# Patient Record
Sex: FEMALE | Race: WHITE
[De-identification: names, ages, dates, MRNs, and addresses within clinical notes are randomized per-mention and may not be internally consistent; named-entity substitution may affect disease eponyms.]

---

## 2017-01-04 ENCOUNTER — HOSPITAL ENCOUNTER (EMERGENCY)
Dept: HOSPITAL 74 - JER | Age: 27
Discharge: HOME | End: 2017-01-04
Payer: COMMERCIAL

## 2017-01-04 VITALS — SYSTOLIC BLOOD PRESSURE: 109 MMHG | DIASTOLIC BLOOD PRESSURE: 78 MMHG | TEMPERATURE: 98.5 F | HEART RATE: 97 BPM

## 2017-01-04 VITALS — BODY MASS INDEX: 24.2 KG/M2

## 2017-01-04 DIAGNOSIS — Z3A.01: ICD-10-CM

## 2017-01-04 DIAGNOSIS — R10.32: ICD-10-CM

## 2017-01-04 DIAGNOSIS — O26.891: Primary | ICD-10-CM

## 2017-01-04 LAB
ALBUMIN SERPL-MCNC: 3.7 G/DL (ref 3.4–5)
ALP SERPL-CCNC: 84 U/L (ref 45–117)
ALT SERPL-CCNC: 19 U/L (ref 12–78)
ANION GAP SERPL CALC-SCNC: 6 MMOL/L (ref 8–16)
APPEARANCE UR: CLEAR
AST SERPL-CCNC: 10 U/L (ref 15–37)
BACTERIA #/AREA URNS HPF: (no result) /HPF
BASOPHILS # BLD: 1 % (ref 0–2)
BILIRUB SERPL-MCNC: 0.7 MG/DL (ref 0.2–1)
BILIRUB UR STRIP.AUTO-MCNC: NEGATIVE MG/DL
CALCIUM SERPL-MCNC: 8.9 MG/DL (ref 8.5–10.1)
CO2 SERPL-SCNC: 31 MMOL/L (ref 21–32)
COLOR UR: YELLOW
CREAT SERPL-MCNC: 0.6 MG/DL (ref 0.55–1.02)
DEPRECATED RDW RBC AUTO: 12.4 % (ref 11.6–15.6)
EOSINOPHIL # BLD: 1.7 % (ref 0–4.5)
GLUCOSE SERPL-MCNC: 102 MG/DL (ref 74–106)
HYALINE CASTS URNS QL MICRO: 3 /LPF
KETONES UR QL STRIP: NEGATIVE
LEUKOCYTE ESTERASE UR QL STRIP.AUTO: NEGATIVE
MCH RBC QN AUTO: 31.3 PG (ref 25.7–33.7)
MCHC RBC AUTO-ENTMCNC: 33.2 G/DL (ref 32–36)
MCV RBC: 94.4 FL (ref 80–96)
MUCOUS THREADS URNS QL MICRO: (no result)
NEUTROPHILS # BLD: 62.1 % (ref 42.8–82.8)
NITRITE UR QL STRIP: NEGATIVE
PH UR: 5 [PH] (ref 5–8)
PLATELET # BLD AUTO: 359 K/MM3 (ref 134–434)
PMV BLD: 7.6 FL (ref 7.5–11.1)
PROT SERPL-MCNC: 7.7 G/DL (ref 6.4–8.2)
PROT UR QL STRIP: NEGATIVE
PROT UR QL STRIP: NEGATIVE
RBC # BLD AUTO: 6 /HPF (ref 0–3)
RBC # UR STRIP: (no result) /UL
SP GR UR: 1.03 (ref 1–1.03)
UROBILINOGEN UR STRIP-MCNC: NEGATIVE E.U./DL (ref 0.2–1)
WBC # BLD AUTO: 10.4 K/MM3 (ref 4–10)
WBC # UR AUTO: 1 /HPF (ref 3–5)

## 2017-01-04 NOTE — PDOC
History of Present Illness





- General


History Source: Patient


Exam Limitations: No Limitations





- History of Present Illness


Initial Comments: 





17 21:35


The patient is a 26 year old pregnant female () with no PMH presents to the 

ED today complaining of LLQ pain for 2 days. The patient reports she recently 

took a home pregnancy test that was positive.  She denies vaginal bleeding/ 

discharge. She is scheduled for prenatal appointment next week. However, she 

developed LLQ pain in the last 2 days.  She states googling her symptoms and 

became concerned for ectopic pregnancy. Patient denies nausea, vomiting, 

diarrhea. Denies frequency, urgency, dysuria, hematuria. She denies loss of 

appetite. 





Denies: fever, chills, coughing, SOB and chest pain.


Allergies: NKDA





<Petrona Rivera - Last Filed: 17 21:35>





- General


History Source: Patient





<Naveen Zaldivar - Last Filed: 17 23:32>





- General


Chief Complaint: Pain, Acute


Stated Complaint: PREG PAIN


Time Seen by Provider: 17 21:17





Past History





<RobinsoniaPetrona - Last Filed: 17 21:35>





- Past Medical History


Asthma: Yes





- Psycho/Social/Smoking Cessation Hx


Suicidal Ideation: No


Smoking Status: No


Smoking History: Never smoked


Number of Cigarettes Smoked Daily: 0





<KishorCarola jamilan - Last Filed: 17 23:32>





- Past Medical History


Allergies/Adverse Reactions: 


 Allergies











Allergy/AdvReac Type Severity Reaction Status Date / Time


 


No Known Allergies Allergy   Verified 17 21:01











Home Medications: 


Ambulatory Orders





NK [No Known Home Medication]  17 











**Review of Systems





- Review of Systems


Able to Perform ROS?: Yes


Comments:: 





17 21:35


CONSTITUTIONAL:


Absent: fever, no chills, no fatigue


EYES:


Absent: visual changes


ENT:


Absent: ear pain, no sore throat


CARDIOVASCULAR:


Absent: chest pain, no palpitations


RESPIRATORY:


Absent: cough, no SOB


GI:


+LLQ pain. Absent: no nausea, no vomiting, no constipation, no diarrhea


GENITOURINARY:


Absent: dysuria, no frequency, no hematuria


MUSKULOSKELETAL:


Absent: back pain, no arthralgia, no myalgia


SKIN:


Absent: rash


NEURO:


Absent: headache








<Petrona Rivera - Last Filed: 17 21:35>





*Physical Exam





- Vital Signs


 Last Vital Signs











Temp Pulse Resp BP Pulse Ox


 


 98.5 F   97 H  18   109/78   99 


 


 17 21:02  17 21:02  17 21:02  17 21:02  17 21:02














- Physical Exam


Comments: 





17 21:36


GENERAL: 


Well-appearing, well-nourished. No apparent distress.


HEENT: 


Normocephalic, atraumatic. PERRL, EOM intact.


CARDIOVASCULAR: 


Normal S1, S2. Regular rate and rhythm.


PULMONARY: 


Clear to auscultation bilaterally.


ABDOMEN: 


Soft, non-distended, mild LLQ tenderness.  


EXTREMITIES: 


Normal ROM in all four extremities. No gross deformities.


SKIN: 


Warm, dry.  No rash


NEUROLOGICAL: 


No focal neurological deficits.








<Petrona Rivera - Last Filed: 17 21:35>





- Vital Signs


 Last Vital Signs











Temp Pulse Resp BP Pulse Ox


 


 98.5 F   97 H  18   109/78   99 


 


 17 21:02  17 21:02  17 21:02  17 21:02  17 21:02














<Naveen Zaldivar - Last Filed: 17 23:32>





ED Treatment Course





- LABORATORY


CBC & Chemistry Diagram: 


 17 21:30





 17 21:30





<Naveen Zaldivar - Last Filed: 17 23:32>





Medical Decision Making





- Medical Decision Making





17 23:32


Dr. Zaldivar: The scribe's documentation has been prepared under my direction 

and personally reviewed by me in its entirery. I confirm that the note above 

accurately reflects all work, treatment, procedures, and medical decision 

making performed by me.





<Naveen Zaldivar - Last Filed: 17 23:32>





*DC/Admit/Observation/Transfer





- Attestations


Scribe Attestion: 





17 21:36





Documentation prepared by Petrona Rivera, acting as medical scribe for Naveen Zaldivar MD/DO.





<NicolePetrona - Last Filed: 17 21:35>





- Discharge Dispostion


Admit: No





<Naveen Zaldivar - Last Filed: 17 23:32>


Diagnosis at time of Disposition: 


 4 weeks gestation of pregnancy





- Discharge Dispostion


Disposition: HOME


Condition at time of disposition: Stable





- Referrals


Referrals: 


Zev Gaitan MD [Staff Physician] - 





- Patient Instructions


Printed Discharge Instructions:  Managing Symptoms of Pregnancy

## 2017-02-18 ENCOUNTER — HOSPITAL ENCOUNTER (EMERGENCY)
Dept: HOSPITAL 74 - JER | Age: 27
LOS: 1 days | Discharge: HOME | End: 2017-02-19
Payer: COMMERCIAL

## 2017-02-18 VITALS — HEART RATE: 122 BPM | DIASTOLIC BLOOD PRESSURE: 70 MMHG | SYSTOLIC BLOOD PRESSURE: 118 MMHG

## 2017-02-18 VITALS — BODY MASS INDEX: 25.8 KG/M2

## 2017-02-18 DIAGNOSIS — Z3A.12: ICD-10-CM

## 2017-02-18 DIAGNOSIS — J09.X2: ICD-10-CM

## 2017-02-18 DIAGNOSIS — O98.511: Primary | ICD-10-CM

## 2017-02-19 VITALS — TEMPERATURE: 99 F

## 2017-02-19 NOTE — PDOC
History of Present Illness





- General


Chief Complaint: Cold Symptoms


Stated Complaint: COLD SYMPTOMS/12 WKS PREGNANT


Time Seen by Provider: 17 00:47





- History of Present Illness


Initial Comments: 


CHIEF COMPLAINT: cold symptoms





HISTORY OF PRESENT ILLNESS: 26 yo 12 week pregnant F presents to ED with fever, 

head and body aches, and runny nose x 1 day.  Patient states the symptoms came 

out of nowhere and she suddenly felt like her whole body was fatigued.  She 

denies nausea, vomiting, diarrhea.





No recent travel or sick contacts. 





PAST MEDICAL HISTORY: Denies past medical history





FAMILY HISTORY: Denies





SOCIAL HISTORY: Denies tobacco, alcohol, illicit drug use. 





SURGICAL HISTORY: Denies





ALLERGIES: No known drug allergies





REVIEW OF SYSTEMS


General/Constitutional: Fever, malaise. Denies weakness, weight change.





HEENT: Denies change in vision. Denies ear pain or discharge. Denies sore 

throat.





Cardiovascular: Denies chest pain or shortness of breath.





Respiratory: Cough. Denies wheezing, or hemoptysis.





Gastrointestinal: Denies nausea, vomiting, diarrhea or constipation. 





Musculoskeletal: Body aches.





Skin and breasts: Denies rash or easy bruising.





Neurologic: Headache.  Denies vertigo, loss of consciousness, or loss of 

sensation.





PHYSICAL EXAM


General Appearance: Well-appearing, appropriately dressed.  No apparent 

distress.





HEENT: Swollen turbinates, rhinorrhea. EOMI, PERRLA, normal ENT inspection, 

normal voice, TMs normal, pharynx normal.  No conjunctival pallor.  No 

photophobia, scleral icterus.





Respiratory/Chest: Lungs CTAB.  





Cardiovascular: RRR. S1, S2.  





Gastrointestinal/Abdominal: Normal bowel sounds.  Abdomen soft, non-distended.  

No tenderness or rebound tenderness. No  organomegaly, pulsatile mass, guarding

, hernia, hepatomegaly, splenomegaly.





Musculoskeletal/Extremities:  Normal inspection. FROM of all extremities, 

normal capillary refill.  Pelvis Stable.  No CVA tenderness. No tenderness to 

extremities, pedal edema, swelling, erythema or deformity.





Integumentary: Appropriate color, dry, warm.  No cyanosis, erythema, jaundice 

or rash





Neurologic: CNs II-XII intact. Fully oriented, alert.  Appropriate mood/affect. 

Motor strength 5/5.  No appreciable EOM palsy, facial droop or sensory deficit.











Past History





- Past Medical History


Allergies/Adverse Reactions: 


 Allergies











Allergy/AdvReac Type Severity Reaction Status Date / Time


 


No Known Allergies Allergy   Verified 17 22:45











Home Medications: 


Ambulatory Orders





Acetaminophen [Tylenol] 650 mg PO QID 17 


Oseltamivir Phosphate [Tamiflu -] 75 mg PO BID #10 capsule 17 








Asthma: Yes





- Reproductive History


 (#): 5


Para: 3


Polycystic Ovaries: No


Therapeutic (s) & number: Yes (1)


Spontaneous : 0





- Psycho/Social/Smoking Cessation Hx


Suicidal Ideation: No


Smoking Status: No


Smoking History: Never smoked


Number of Cigarettes Smoked Daily: 0





*Physical Exam





- Vital Signs


 Last Vital Signs











Temp Pulse Resp BP Pulse Ox


 


 99 F   122 H  18   118/70   98 


 


 17 01:21  17 22:47  17 22:47  17 22:47  17 22:47














ED Treatment Course





- ADDITIONAL ORDERS


Additional order review: 














 17 01:57 Influenza Types A,B Antigen (PABLO) - Final





 Nasopharyngeal Swab  - Final


 


 17 01:57 Group A Strep Rapid Antigen - Final





 Throat 














- Medications


Given in the ED: 


ED Medications














Discontinued Medications














Generic Name Dose Route Start Last Admin





  Trade Name Freq  PRN Reason Stop Dose Admin


 


Acetaminophen  650 mg 17 01:56 17 02:05





  Tylenol -  PO 17 01:57  650 mg





  ONCE ONE   Administration


 


Diphenhydramine HCl  25 mg 17 01:39 17 02:05





  Benadryl Injection -  IVPUSH 17 01:40  25 mg





  ONCE ONE   Administration


 


Metoclopramide HCl  10 mg 17 01:39 17 02:05





  Reglan Injection -  IVPUSH 17 01:40  10 mg





  ONCE ONE   Administration


 


Sodium Chloride  1,000 ml 17 01:38 17 02:05





  Normal Saline -  IV 17 01:39  1,000 ml





  ONCE ONE   Administration














Medical Decision Making





- Medical Decision Making


26 yo 12 week pregnant F presents to ED with sudden onset cold symptoms and 

fever. 





-rapid flu and strep swabs





Flu positive





-Tamiflu po, script sent to pharm





Advised patient to take medications as prescribed and follow up with PMD.  

Advised patient of signs and symptoms for return to ER; patient verbalized 

understanding and agrees to plan. 











*DC/Admit/Observation/Transfer


Diagnosis at time of Disposition: 


 Influenza A





- Discharge Dispostion


Disposition: HOME


Condition at time of disposition: Stable


Admit: No





- Prescriptions


Prescriptions: 


Oseltamivir Phosphate [Tamiflu -] 75 mg PO BID #10 capsule





- Referrals


Referrals: 


STAFF,NOT ON [Primary Care Provider] - 





- Patient Instructions


Printed Discharge Instructions:  DI for Influenza -- Adult


Additional Instructions: 


Please take medication as prescribed.  Continue taking Tylenol for fever or 

pain.  If you experience persistent fever unrelieved by Tylenol, or continuous 

vomiting or diarrhea, shortness of breath, chest pain, vaginal bleeding, or any 

new or worsening symptoms, please return to the ER.

## 2018-11-04 ENCOUNTER — INPATIENT (INPATIENT)
Facility: HOSPITAL | Age: 28
LOS: 1 days | Discharge: HOME | End: 2018-11-06
Attending: PLASTIC SURGERY | Admitting: PLASTIC SURGERY
Payer: MEDICAID

## 2018-11-04 VITALS
RESPIRATION RATE: 21 BRPM | HEART RATE: 96 BPM | DIASTOLIC BLOOD PRESSURE: 83 MMHG | OXYGEN SATURATION: 98 % | SYSTOLIC BLOOD PRESSURE: 125 MMHG | TEMPERATURE: 98 F

## 2018-11-04 LAB
APTT BLD: 30.3 SEC — SIGNIFICANT CHANGE UP (ref 27.5–36.3)
BASOPHILS # BLD AUTO: 0.04 K/UL — SIGNIFICANT CHANGE UP (ref 0–0.2)
BASOPHILS NFR BLD AUTO: 0.4 % — SIGNIFICANT CHANGE UP (ref 0–2)
EOSINOPHIL # BLD AUTO: 0.07 K/UL — SIGNIFICANT CHANGE UP (ref 0–0.5)
EOSINOPHIL NFR BLD AUTO: 0.6 % — SIGNIFICANT CHANGE UP (ref 0–6)
HCT VFR BLD CALC: 36.1 % — SIGNIFICANT CHANGE UP (ref 34.5–45)
HGB BLD-MCNC: 12.5 G/DL — SIGNIFICANT CHANGE UP (ref 11.5–15.5)
IMM GRANULOCYTES # BLD AUTO: 0.04 # — SIGNIFICANT CHANGE UP
IMM GRANULOCYTES NFR BLD AUTO: 0.4 % — SIGNIFICANT CHANGE UP (ref 0–1.5)
INR BLD: 1.36 — HIGH (ref 0.88–1.17)
LYMPHOCYTES # BLD AUTO: 2.62 K/UL — SIGNIFICANT CHANGE UP (ref 1–3.3)
LYMPHOCYTES # BLD AUTO: 23.9 % — SIGNIFICANT CHANGE UP (ref 13–44)
MCHC RBC-ENTMCNC: 32.6 PG — SIGNIFICANT CHANGE UP (ref 27–34)
MCHC RBC-ENTMCNC: 34.6 % — SIGNIFICANT CHANGE UP (ref 32–36)
MCV RBC AUTO: 94.3 FL — SIGNIFICANT CHANGE UP (ref 80–100)
MONOCYTES # BLD AUTO: 0.85 K/UL — SIGNIFICANT CHANGE UP (ref 0–0.9)
MONOCYTES NFR BLD AUTO: 7.8 % — SIGNIFICANT CHANGE UP (ref 2–14)
NEUTROPHILS # BLD AUTO: 7.32 K/UL — SIGNIFICANT CHANGE UP (ref 1.8–7.4)
NEUTROPHILS NFR BLD AUTO: 66.9 % — SIGNIFICANT CHANGE UP (ref 43–77)
NRBC # FLD: 0 — SIGNIFICANT CHANGE UP
PLATELET # BLD AUTO: 356 K/UL — SIGNIFICANT CHANGE UP (ref 150–400)
PMV BLD: 8.8 FL — SIGNIFICANT CHANGE UP (ref 7–13)
PROTHROM AB SERPL-ACNC: 15.2 SEC — HIGH (ref 9.8–13.1)
RBC # BLD: 3.83 M/UL — SIGNIFICANT CHANGE UP (ref 3.8–5.2)
RBC # FLD: 11.8 % — SIGNIFICANT CHANGE UP (ref 10.3–14.5)
WBC # BLD: 10.94 K/UL — HIGH (ref 3.8–10.5)
WBC # FLD AUTO: 10.94 K/UL — HIGH (ref 3.8–10.5)

## 2018-11-04 RX ORDER — MORPHINE SULFATE 50 MG/1
4 CAPSULE, EXTENDED RELEASE ORAL ONCE
Qty: 0 | Refills: 0 | Status: DISCONTINUED | OUTPATIENT
Start: 2018-11-04 | End: 2018-11-04

## 2018-11-04 RX ORDER — HYDROMORPHONE HYDROCHLORIDE 2 MG/ML
1 INJECTION INTRAMUSCULAR; INTRAVENOUS; SUBCUTANEOUS ONCE
Qty: 0 | Refills: 0 | Status: DISCONTINUED | OUTPATIENT
Start: 2018-11-04 | End: 2018-11-04

## 2018-11-04 RX ADMIN — MORPHINE SULFATE 4 MILLIGRAM(S): 50 CAPSULE, EXTENDED RELEASE ORAL at 23:36

## 2018-11-04 NOTE — ED PROVIDER NOTE - OBJECTIVE STATEMENT
27 yo PMHx anxiety p/w right breast pain. Pt had silicon implants placed on 10/24 with a surgeon in [place] and followed up with surgeon on 10/2 with 27 yo PMHx anxiety p/w right breast pain. Pt had silicon implants placed on 10/24 with a surgeon in [place] and followed up with surgeon on 11/2 - at that point there were no complications. Today around 2 pm pt started having pain in the right breast. She went to [facility] and had an outpatient US done which showed a fluid collection (possible hematoma?) in the breast. She spoke to her surgeon who advised her to come back to [place] for evaluation. She was at the airport and then started having worsening right breast pain. She came to ER by ambulance for further evaluation. She denies fevers/chills, N/V, abd pain, discharge from the breast. 27 yo PMHx anxiety p/w right breast pain. Pt had silicon implants placed on 10/24 with a surgeon in [place] and followed up with surgeon on 11/2 - at that point there were no complications. Today around 2 pm pt started having pain in the right breast. She went to [facility] and had an outpatient US done which showed a fluid collection (possible hematoma?) in the breast. She spoke to her surgeon who advised her to come back to [place] for evaluation. She was at the airport and then started having worsening right breast pain. She came to ER by ambulance for further evaluation. She denies fevers/chills, N/V, abd pain, discharge from the breast. Pt was on Cephalexin x 7 days s/p surgery.

## 2018-11-04 NOTE — ED ADULT NURSE NOTE - OBJECTIVE STATEMENT
Received pt in spot 17. [id number]. S/p breast implant surgery in [place] on 10/24/18. Pt had follow up appt in [place] on Friday where pt states everything was normal. Today at approx 130pm pt developed right sided breast pain and swelling. Right breast appears swollen, no drainage from nipple. Pt states left breast only has pressure and no pain. Denies fevers/chills. Denies N/V. VS as noted. 20G in place by ems to right wrist, labs sent. Will continue to monitor.

## 2018-11-04 NOTE — ED PROVIDER NOTE - PROGRESS NOTE DETAILS
Seun SIMMS: Pt signed out to me.  She reports some improvement in pain despite morphine and dilaudid.  US without e/o abscess or fluid collection.  Will rx symptomatically for mastitis, d/w patient need for obs for pain control, plastics eval (pt is in contact with her surgeon Dr Patino [phone] cell) who is ?coming to evaluate her/follow-up with her.

## 2018-11-04 NOTE — ED PROVIDER NOTE - ATTENDING CONTRIBUTION TO CARE
Locurto  pt here s/p recent b/l breast implant  c/o pain and swelling to right breast  no fever  no discharge from surgical scar        exam  pt with clear lungs card RRR S1S2  rt breast swollen c/w left  not red  tender upper breat firm to touch  surgical scar clean  no discharge      Plan  check CBC  arrange for focused US  here

## 2018-11-04 NOTE — ED ADULT TRIAGE NOTE - CHIEF COMPLAINT QUOTE
Pt brought in by EMS c/o pain secondary to B/L breast implantation approx 1 week ago (10/24/18).  Pt arrives with 20g IVL to R wrist.  Received 5mg morphine by EMS.  Pt states on ultrasound has R breast hematoma.  R breast noticeably more swollen than L.  Denies any PMHx.

## 2018-11-04 NOTE — ED PROVIDER NOTE - PHYSICAL EXAMINATION
Gen: AAOx3, non-toxic, pt screaming in pain  Head: NCAT  HEENT: EOMI, oral mucosa moist, normal conjunctiva  Lung: CTAB, no respiratory distress, no wheezes/rhonchi/rales B/L, speaking in full sentences  CV: RRR, no murmurs, rubs or gallops  Breast: (chaperoned by Lucila RN - right breast with tenderness to superior aspect, more firm compared to left breast, no erythema or discharge, R breast larger than left and swollen at superior aspect).  Abd: soft, NTND, no guarding  MSK: no visible deformities  Neuro: No focal sensory or motor deficits  Skin: Warm, well perfused, no rash  Psych: normal affect.   ~Vikram Duvall M.D. Resident

## 2018-11-04 NOTE — ED ADULT NURSE NOTE - NSIMPLEMENTINTERV_GEN_ALL_ED
Implemented All Fall Risk Interventions:  [place] to call system. Call bell, personal items and telephone within reach. Instruct patient to call for assistance. Room bathroom lighting operational. Non-slip footwear when patient is off stretcher. Physically safe environment: no spills, clutter or unnecessary equipment. Stretcher in lowest position, wheels locked, appropriate side rails in place. Provide visual cue, wrist band, yellow gown, etc. Monitor gait and stability. Monitor for mental status changes and reorient to person, place, and time. Review medications for side effects contributing to fall risk. Reinforce activity limits and safety measures with patient and family.

## 2018-11-04 NOTE — ED PROVIDER NOTE - NS ED ROS FT
GENERAL: No fever or chills, EYES: no change in vision, HEENT: no trouble swallowing or speaking, CARDIAC: no chest pain, PULMONARY: no cough or SOB, GI: no abdominal pain, no nausea, no vomiting, no diarrhea or constipation, : No changes in urination, SKIN: no rashes, R breast pain, NEURO: no headache,  MSK: No joint pain ~Vikram Duvall M.D. Resident

## 2018-11-05 DIAGNOSIS — Z98.86 PERSONAL HISTORY OF BREAST IMPLANT REMOVAL: Chronic | ICD-10-CM

## 2018-11-05 DIAGNOSIS — N64.4 MASTODYNIA: ICD-10-CM

## 2018-11-05 DIAGNOSIS — Z98.82 BREAST IMPLANT STATUS: Chronic | ICD-10-CM

## 2018-11-05 LAB
ALBUMIN SERPL ELPH-MCNC: 4 G/DL — SIGNIFICANT CHANGE UP (ref 3.3–5)
ALP SERPL-CCNC: 72 U/L — SIGNIFICANT CHANGE UP (ref 40–120)
ALT FLD-CCNC: 23 U/L — SIGNIFICANT CHANGE UP (ref 4–33)
APTT BLD: 30.7 SEC — SIGNIFICANT CHANGE UP (ref 27.5–36.3)
AST SERPL-CCNC: 43 U/L — HIGH (ref 4–32)
BASOPHILS # BLD AUTO: 0.04 K/UL — SIGNIFICANT CHANGE UP (ref 0–0.2)
BASOPHILS NFR BLD AUTO: 0.4 % — SIGNIFICANT CHANGE UP (ref 0–2)
BILIRUB SERPL-MCNC: 0.5 MG/DL — SIGNIFICANT CHANGE UP (ref 0.2–1.2)
BLD GP AB SCN SERPL QL: NEGATIVE — SIGNIFICANT CHANGE UP
BLD GP AB SCN SERPL QL: NEGATIVE — SIGNIFICANT CHANGE UP
BUN SERPL-MCNC: 10 MG/DL — SIGNIFICANT CHANGE UP (ref 7–23)
BUN SERPL-MCNC: 12 MG/DL — SIGNIFICANT CHANGE UP (ref 7–23)
CALCIUM SERPL-MCNC: 8.7 MG/DL — SIGNIFICANT CHANGE UP (ref 8.4–10.5)
CALCIUM SERPL-MCNC: 8.9 MG/DL — SIGNIFICANT CHANGE UP (ref 8.4–10.5)
CHLORIDE SERPL-SCNC: 101 MMOL/L — SIGNIFICANT CHANGE UP (ref 98–107)
CHLORIDE SERPL-SCNC: 99 MMOL/L — SIGNIFICANT CHANGE UP (ref 98–107)
CO2 SERPL-SCNC: 22 MMOL/L — SIGNIFICANT CHANGE UP (ref 22–31)
CO2 SERPL-SCNC: 23 MMOL/L — SIGNIFICANT CHANGE UP (ref 22–31)
CREAT SERPL-MCNC: 0.57 MG/DL — SIGNIFICANT CHANGE UP (ref 0.5–1.3)
CREAT SERPL-MCNC: 0.6 MG/DL — SIGNIFICANT CHANGE UP (ref 0.5–1.3)
EOSINOPHIL # BLD AUTO: 0.04 K/UL — SIGNIFICANT CHANGE UP (ref 0–0.5)
EOSINOPHIL NFR BLD AUTO: 0.4 % — SIGNIFICANT CHANGE UP (ref 0–6)
GLUCOSE SERPL-MCNC: 124 MG/DL — HIGH (ref 70–99)
GLUCOSE SERPL-MCNC: 135 MG/DL — HIGH (ref 70–99)
HCG SERPL-ACNC: < 5 MIU/ML — SIGNIFICANT CHANGE UP
HCT VFR BLD CALC: 34.4 % — LOW (ref 34.5–45)
HGB BLD-MCNC: 12 G/DL — SIGNIFICANT CHANGE UP (ref 11.5–15.5)
IMM GRANULOCYTES # BLD AUTO: 0.04 # — SIGNIFICANT CHANGE UP
IMM GRANULOCYTES NFR BLD AUTO: 0.4 % — SIGNIFICANT CHANGE UP (ref 0–1.5)
INR BLD: 1.46 — HIGH (ref 0.88–1.17)
LYMPHOCYTES # BLD AUTO: 2.68 K/UL — SIGNIFICANT CHANGE UP (ref 1–3.3)
LYMPHOCYTES # BLD AUTO: 24.9 % — SIGNIFICANT CHANGE UP (ref 13–44)
MCHC RBC-ENTMCNC: 33.6 PG — SIGNIFICANT CHANGE UP (ref 27–34)
MCHC RBC-ENTMCNC: 34.9 % — SIGNIFICANT CHANGE UP (ref 32–36)
MCV RBC AUTO: 96.4 FL — SIGNIFICANT CHANGE UP (ref 80–100)
MONOCYTES # BLD AUTO: 1.08 K/UL — HIGH (ref 0–0.9)
MONOCYTES NFR BLD AUTO: 10 % — SIGNIFICANT CHANGE UP (ref 2–14)
NEUTROPHILS # BLD AUTO: 6.88 K/UL — SIGNIFICANT CHANGE UP (ref 1.8–7.4)
NEUTROPHILS NFR BLD AUTO: 63.9 % — SIGNIFICANT CHANGE UP (ref 43–77)
NRBC # FLD: 0 — SIGNIFICANT CHANGE UP
PLATELET # BLD AUTO: 345 K/UL — SIGNIFICANT CHANGE UP (ref 150–400)
PMV BLD: 8.8 FL — SIGNIFICANT CHANGE UP (ref 7–13)
POTASSIUM SERPL-MCNC: 4 MMOL/L — SIGNIFICANT CHANGE UP (ref 3.5–5.3)
POTASSIUM SERPL-MCNC: 6.1 MMOL/L — HIGH (ref 3.5–5.3)
POTASSIUM SERPL-SCNC: 4 MMOL/L — SIGNIFICANT CHANGE UP (ref 3.5–5.3)
POTASSIUM SERPL-SCNC: 6.1 MMOL/L — HIGH (ref 3.5–5.3)
PROT SERPL-MCNC: 7.3 G/DL — SIGNIFICANT CHANGE UP (ref 6–8.3)
PROTHROM AB SERPL-ACNC: 16.4 SEC — HIGH (ref 9.8–13.1)
RBC # BLD: 3.57 M/UL — LOW (ref 3.8–5.2)
RBC # FLD: 11.8 % — SIGNIFICANT CHANGE UP (ref 10.3–14.5)
RH IG SCN BLD-IMP: POSITIVE — SIGNIFICANT CHANGE UP
RH IG SCN BLD-IMP: POSITIVE — SIGNIFICANT CHANGE UP
SODIUM SERPL-SCNC: 135 MMOL/L — SIGNIFICANT CHANGE UP (ref 135–145)
SODIUM SERPL-SCNC: 138 MMOL/L — SIGNIFICANT CHANGE UP (ref 135–145)
WBC # BLD: 10.76 K/UL — HIGH (ref 3.8–10.5)
WBC # FLD AUTO: 10.76 K/UL — HIGH (ref 3.8–10.5)

## 2018-11-05 PROCEDURE — 76642 ULTRASOUND BREAST LIMITED: CPT | Mod: 26,RT

## 2018-11-05 PROCEDURE — 76641 ULTRASOUND BREAST COMPLETE: CPT | Mod: 26,RT,59

## 2018-11-05 PROCEDURE — 88304 TISSUE EXAM BY PATHOLOGIST: CPT | Mod: 26

## 2018-11-05 PROCEDURE — 71046 X-RAY EXAM CHEST 2 VIEWS: CPT | Mod: 26

## 2018-11-05 RX ORDER — KETOROLAC TROMETHAMINE 30 MG/ML
15 SYRINGE (ML) INJECTION ONCE
Qty: 0 | Refills: 0 | Status: DISCONTINUED | OUTPATIENT
Start: 2018-11-05 | End: 2018-11-05

## 2018-11-05 RX ORDER — MORPHINE SULFATE 50 MG/1
2 CAPSULE, EXTENDED RELEASE ORAL ONCE
Qty: 0 | Refills: 0 | Status: DISCONTINUED | OUTPATIENT
Start: 2018-11-05 | End: 2018-11-05

## 2018-11-05 RX ORDER — ONDANSETRON 8 MG/1
4 TABLET, FILM COATED ORAL EVERY 6 HOURS
Qty: 0 | Refills: 0 | Status: DISCONTINUED | OUTPATIENT
Start: 2018-11-05 | End: 2018-11-06

## 2018-11-05 RX ORDER — CEFAZOLIN SODIUM 1 G
1000 VIAL (EA) INJECTION ONCE
Qty: 0 | Refills: 0 | Status: COMPLETED | OUTPATIENT
Start: 2018-11-05 | End: 2018-11-05

## 2018-11-05 RX ORDER — OXYCODONE AND ACETAMINOPHEN 5; 325 MG/1; MG/1
1 TABLET ORAL ONCE
Qty: 0 | Refills: 0 | Status: DISCONTINUED | OUTPATIENT
Start: 2018-11-05 | End: 2018-11-05

## 2018-11-05 RX ORDER — ACETAMINOPHEN 500 MG
1000 TABLET ORAL ONCE
Qty: 0 | Refills: 0 | Status: COMPLETED | OUTPATIENT
Start: 2018-11-05 | End: 2018-11-05

## 2018-11-05 RX ORDER — CEFAZOLIN SODIUM 1 G
1000 VIAL (EA) INJECTION EVERY 8 HOURS
Qty: 0 | Refills: 0 | Status: DISCONTINUED | OUTPATIENT
Start: 2018-11-05 | End: 2018-11-06

## 2018-11-05 RX ORDER — ONDANSETRON 8 MG/1
4 TABLET, FILM COATED ORAL ONCE
Qty: 0 | Refills: 0 | Status: COMPLETED | OUTPATIENT
Start: 2018-11-05 | End: 2018-11-05

## 2018-11-05 RX ORDER — OXYCODONE HYDROCHLORIDE 5 MG/1
5 TABLET ORAL EVERY 6 HOURS
Qty: 0 | Refills: 0 | Status: DISCONTINUED | OUTPATIENT
Start: 2018-11-05 | End: 2018-11-06

## 2018-11-05 RX ORDER — ENOXAPARIN SODIUM 100 MG/ML
40 INJECTION SUBCUTANEOUS DAILY
Qty: 0 | Refills: 0 | Status: DISCONTINUED | OUTPATIENT
Start: 2018-11-05 | End: 2018-11-06

## 2018-11-05 RX ORDER — HYDROMORPHONE HYDROCHLORIDE 2 MG/ML
1 INJECTION INTRAMUSCULAR; INTRAVENOUS; SUBCUTANEOUS ONCE
Qty: 0 | Refills: 0 | Status: DISCONTINUED | OUTPATIENT
Start: 2018-11-05 | End: 2018-11-05

## 2018-11-05 RX ORDER — ACETAMINOPHEN 500 MG
650 TABLET ORAL EVERY 6 HOURS
Qty: 0 | Refills: 0 | Status: DISCONTINUED | OUTPATIENT
Start: 2018-11-05 | End: 2018-11-06

## 2018-11-05 RX ORDER — DIAZEPAM 5 MG
2 TABLET ORAL ONCE
Qty: 0 | Refills: 0 | Status: DISCONTINUED | OUTPATIENT
Start: 2018-11-05 | End: 2018-11-05

## 2018-11-05 RX ORDER — HYDROMORPHONE HYDROCHLORIDE 2 MG/ML
0.5 INJECTION INTRAMUSCULAR; INTRAVENOUS; SUBCUTANEOUS
Qty: 0 | Refills: 0 | Status: DISCONTINUED | OUTPATIENT
Start: 2018-11-05 | End: 2018-11-06

## 2018-11-05 RX ORDER — SODIUM CHLORIDE 9 MG/ML
1000 INJECTION, SOLUTION INTRAVENOUS
Qty: 0 | Refills: 0 | Status: DISCONTINUED | OUTPATIENT
Start: 2018-11-05 | End: 2018-11-06

## 2018-11-05 RX ORDER — DIAZEPAM 5 MG
5 TABLET ORAL ONCE
Qty: 0 | Refills: 0 | Status: DISCONTINUED | OUTPATIENT
Start: 2018-11-05 | End: 2018-11-05

## 2018-11-05 RX ORDER — CEPHALEXIN 500 MG
1 CAPSULE ORAL
Qty: 14 | Refills: 0 | OUTPATIENT
Start: 2018-11-05 | End: 2018-11-11

## 2018-11-05 RX ADMIN — Medication 1000 MILLIGRAM(S): at 09:28

## 2018-11-05 RX ADMIN — Medication 15 MILLIGRAM(S): at 03:09

## 2018-11-05 RX ADMIN — HYDROMORPHONE HYDROCHLORIDE 1 MILLIGRAM(S): 2 INJECTION INTRAMUSCULAR; INTRAVENOUS; SUBCUTANEOUS at 08:04

## 2018-11-05 RX ADMIN — ONDANSETRON 4 MILLIGRAM(S): 8 TABLET, FILM COATED ORAL at 16:20

## 2018-11-05 RX ADMIN — MORPHINE SULFATE 4 MILLIGRAM(S): 50 CAPSULE, EXTENDED RELEASE ORAL at 01:13

## 2018-11-05 RX ADMIN — Medication 15 MILLIGRAM(S): at 08:15

## 2018-11-05 RX ADMIN — Medication 5 MILLIGRAM(S): at 03:28

## 2018-11-05 RX ADMIN — OXYCODONE AND ACETAMINOPHEN 1 TABLET(S): 5; 325 TABLET ORAL at 06:35

## 2018-11-05 RX ADMIN — Medication 15 MILLIGRAM(S): at 02:35

## 2018-11-05 RX ADMIN — OXYCODONE AND ACETAMINOPHEN 1 TABLET(S): 5; 325 TABLET ORAL at 07:05

## 2018-11-05 RX ADMIN — ONDANSETRON 4 MILLIGRAM(S): 8 TABLET, FILM COATED ORAL at 00:02

## 2018-11-05 RX ADMIN — HYDROMORPHONE HYDROCHLORIDE 1 MILLIGRAM(S): 2 INJECTION INTRAMUSCULAR; INTRAVENOUS; SUBCUTANEOUS at 01:13

## 2018-11-05 RX ADMIN — Medication 1000 MILLIGRAM(S): at 09:30

## 2018-11-05 RX ADMIN — Medication 2 MILLIGRAM(S): at 22:51

## 2018-11-05 RX ADMIN — MORPHINE SULFATE 2 MILLIGRAM(S): 50 CAPSULE, EXTENDED RELEASE ORAL at 07:29

## 2018-11-05 RX ADMIN — Medication 100 MILLIGRAM(S): at 03:28

## 2018-11-05 RX ADMIN — HYDROMORPHONE HYDROCHLORIDE 1 MILLIGRAM(S): 2 INJECTION INTRAMUSCULAR; INTRAVENOUS; SUBCUTANEOUS at 07:52

## 2018-11-05 RX ADMIN — Medication 400 MILLIGRAM(S): at 08:58

## 2018-11-05 RX ADMIN — MORPHINE SULFATE 2 MILLIGRAM(S): 50 CAPSULE, EXTENDED RELEASE ORAL at 07:44

## 2018-11-05 RX ADMIN — Medication 15 MILLIGRAM(S): at 08:00

## 2018-11-05 RX ADMIN — HYDROMORPHONE HYDROCHLORIDE 1 MILLIGRAM(S): 2 INJECTION INTRAMUSCULAR; INTRAVENOUS; SUBCUTANEOUS at 00:02

## 2018-11-05 RX ADMIN — Medication 100 MILLIGRAM(S): at 22:07

## 2018-11-05 RX ADMIN — SODIUM CHLORIDE 75 MILLILITER(S): 9 INJECTION, SOLUTION INTRAVENOUS at 15:15

## 2018-11-05 RX ADMIN — ENOXAPARIN SODIUM 40 MILLIGRAM(S): 100 INJECTION SUBCUTANEOUS at 22:09

## 2018-11-05 NOTE — DISCHARGE NOTE ADULT - INSTRUCTIONS
Please resume your regular diet. Notify MD if experiencing fever, nausea, vomiting or if drain pulls out or develops air leak.

## 2018-11-05 NOTE — ED CDU PROVIDER INITIAL DAY NOTE - PROGRESS NOTE DETAILS
Familia SIMMS: Patient signed out to me. Right breast pain with worsening swelling and pain. Reassessed - worsening pain and swelling with swelling beyond area. Discussed with Dr. Jayden Patino, [phone] who feels this is an expanding hematoma and plastics should be on board. Familia SIMMS: Discussed with Dr. Rosales, plastics, in detail, who is requesting MRI of breast and possible re-read of ultrasound. Discussed Dr. Patino's concern with him. He states he will send someone to evaluate the patient. Repeat CBC ordered. Familia SIMMS: Patient signed out to me. Right breast pain with worsening swelling and pain. Reassessed - worsening pain and swelling with swelling beyond area of demarcation. Morphine was given, dilaudid and toradol ordered as patient's pain uncontrolled. Discussed with rufina's plastic surgeon who called her, Dr. Jayden Patino, [phone] who feels this is an expanding hematoma and plastics should be on board. Familia SIMMS: Patient signed out to me. Right breast pain with worsening swelling and pain. Patient reassessed - worsening pain and swelling with swelling beyond area of demarcation. Morphine was given but patient still in pain on my reassessment. Dilaudid and toradol ordered as patient's pain uncontrolled. Discussed with patient's plastic surgeon who called her, Dr. Jayden Patino, [phone] who feels this is an expanding hematoma and plastics should be on board. Dr. Calvin of plastics saw the patient in the ED and feels patient may need to be taken to the OR. I discussed repeat US read with the [employer] at [phone] who will pass on the information to the attending who she says is Dr. Caprice Pringle. I gave her the message and my number for a return call. Dr. Calvin bedside with patient, states MRI can be discontinued, per her discussion with Dr. Rosales will be taken to the OR. Repeat CBC sent, coags and T&S resulted, pt NPO. Awaiting confirmation from Dr. Rosales's service that patient will be admitted to his service. VSS, pt requesting more pain medication, IV Tylenol ordered. Familia SIMMS: Dr. Calvin of plastics saw the patient in the ED and feels patient may need to be taken to the OR. I discussed repeat US read with the [employer] at [phone] who will pass on the information to the attending who she says is Dr. Caprice Pringle. I gave her the message and my number for a return call. Familia SIMMS: Dr. Caprice Pringle of radiology called back and reviewed ultrasound. On the images she is reviewing, she does not see a hematoma. I made her aware of patient's worsening pain and plastics plan to take her to the OR. If plan changes, or repeat imaging needed, can contact her at [phone]. Familia SIMMS: Received call from Dr. Caprice Ruffin confirming admission to Dr. Rosales, requesting repeat labs including coags and type and screen. Familia SIMMS: Discussed with Dr. Caprice Pringle, attending radiologist. US breast right ordered. There is a discussion as to whether there are techs who are trained in breast ultrasound are available. Familia SIMMS: Discussed with Dr. Caprice Pringle - patient can be called for ultrasound as there is a tech available. Dr. Caprice Ruffin of plastics in the ED consenting patient for OR. Familia SIMMS: Dr. Rosales saw patient in CDU and will be taking patient to the OR.

## 2018-11-05 NOTE — DISCHARGE NOTE ADULT - ADDITIONAL INSTRUCTIONS
Please follow up with Dr. Rosales within x1 week after discharge from the hospital. You may call [phone] to schedule an appointment.

## 2018-11-05 NOTE — BRIEF OPERATIVE NOTE - OPERATION/FINDINGS
IMF incision was made to R breast with exposure of implant; ~300cc hematoma evacuation with implant removal and washout of pocket. Implant was replaced sub muscularly, Gonsalo X 1

## 2018-11-05 NOTE — DISCHARGE NOTE ADULT - MEDICATION SUMMARY - MEDICATIONS TO TAKE
I will START or STAY ON the medications listed below when I get home from the hospital:    Percocet 5/325 oral tablet  -- 1 tab(s) by mouth every 4 hours as needed for pain MDD:6  -- Caution federal law prohibits the transfer of this drug to any person other  than the person for whom it was prescribed.  May cause drowsiness.  Alcohol may intensify this effect.  Use care when operating dangerous machinery.  This prescription cannot be refilled.  This product contains acetaminophen.  Do not use  with any other product containing acetaminophen to prevent possible liver damage.  Using more of this medication than prescribed may cause serious breathing problems.    -- Indication: For Pain    Keflex 500 mg oral capsule  -- 1 cap(s) by mouth every 12 hours   -- Finish all this medication unless otherwise directed by prescriber.    -- Indication: For Anti microbial

## 2018-11-05 NOTE — H&P ADULT - NSHPPHYSICALEXAM_GEN_ALL_CORE
General: Appears uncomfortable, no acute distress  Breasts: Bilateral breast s/p minesh areolar breast augmentation, incisions appear CDI. Right breast with superior fullness and swelling, collection noted with tenderness to palpation.

## 2018-11-05 NOTE — ED CDU PROVIDER INITIAL DAY NOTE - MEDICAL DECISION MAKING DETAILS
Pt is a 28 y.o female with PMHx of anxiety who presents to ED c/o Rt side breast pain x 1 day s/p breast augmentation 10/24/18. Pt seen and worked up in ED, labs show mild leukocytosis, US normal, patient continued to have pain despite analgesic given in ED, transferred to CDU for; pain control, warm compresses, Dose of IV given by ED for mastitis and Plastic consult in AM.

## 2018-11-05 NOTE — DISCHARGE NOTE ADULT - PATIENT PORTAL LINK FT
You can access the [facility] Patient Portal, offered by [facility], by registering with the following website: [email or web address]

## 2018-11-05 NOTE — DISCHARGE NOTE ADULT - CARE PLAN
Principal Discharge DX:	Breast hematoma  Goal:	Perioperative management  Assessment and plan of treatment:	Please keep your dressings clean and dry. You will be discharged with a JERRY drain. You will need to empty them and record outputs accurately. This will be taught to you by the nursing staff. Please do not remove the JERRY drains. They will be removed in the office. Please bring to the office accurate records of output.

## 2018-11-05 NOTE — ED CDU PROVIDER INITIAL DAY NOTE - PHYSICAL EXAMINATION
Vital signs reviewed.   CONSTITUTIONAL: Well-appearing; well-nourished; in no apparent distress. Non-toxic appearing.   HEAD: Normocephalic, atraumatic.  EYES: PERRL, EOM intact, conjunctiva and sclera WNL.  ENT: normal nose; no rhinorrhea  CARD: Normal S1, S2; no murmurs, rubs, or gallops noted. +tenderneness over Rt anterior chest wall, area swollen surrounding Rt breast impant, skin taut. No signs of erythema, discharge, no induration or fluctuance appreciated. Lt breast, normal, non-tender. No ecchymosis noted.   RESP: Normal chest excursion with respiration; breath sounds clear and equal bilaterally; no wheezes, rhonchi, or rales.  ABD/GI: soft, non-distended; non-tender; no palpable organomegaly, no pulsatile mass.  EXT/MS: moves all extremities; distal pulses are normal  SKIN: Normal for age and race; warm; dry; good turgor; no apparent lesions or exudate noted. No erythema, fluctuance, induration, signs of infection noted. Skin slightly hard and tender to palpation superior Rt breast. Lt breast soft, non-tender.   NEURO: Awake, alert, oriented x 3, no gross  PSYCH: Normal mood; appropriate affect.

## 2018-11-05 NOTE — DISCHARGE NOTE ADULT - CARE PROVIDER_API CALL
Lionel Rosales (DO), Plastic Surgery  [address] [place] [address]  Phone: [phone]  Fax: [phone]

## 2018-11-05 NOTE — DISCHARGE NOTE ADULT - CONDITIONS AT DISCHARGE
Pt ambulates and voids without difficulty. Pt A+OX4. Pt's VSS. Pt has right Breast JERRY. Pt received JERRY teaching and supplies as well as printed education material.

## 2018-11-05 NOTE — H&P ADULT - HISTORY OF PRESENT ILLNESS
Patient is a 28F presented at [facility] emergency department for R breast pain and swelling which started yesterday. The patient went had a breast augmentation in [place] on 10/24/18 and flew back to [place]. Yesterday she noted a "spasm" in her right breast which worsened with increased swelling and pressure. She was advised to come to the local emergency department for evaluation. On exam the patient was noted to have tenderness to palpation and noticeable swelling on exam to her right superior breasts. She was admitted to [facility] under Dr. Rosales and was consented for the operating room.

## 2018-11-05 NOTE — DISCHARGE NOTE ADULT - PLAN OF CARE
Perioperative management Please keep your dressings clean and dry. You will be discharged with a JERRY drain. You will need to empty them and record outputs accurately. This will be taught to you by the nursing staff. Please do not remove the JERRY drains. They will be removed in the office. Please bring to the office accurate records of output.

## 2018-11-05 NOTE — ED CDU PROVIDER INITIAL DAY NOTE - OBJECTIVE STATEMENT
HPI: Pt is a 28 y.o female with PMHx of anxiety who presents to ED c/o Rt side breast pain s/p breast augmentation 10/24/18. As per patient states that she underwent breast augmentation in [place] with Surgeon Dr. Valverde without complications, patient had f/u appt. 11/218, patient then flew home to [place] that day. Yesterday afternoon patient began to have Rt breast pain, went to [facility] had labs and US that patient states "showed hematoma", states she spoke with her surgeon and was advised to fly to [place] for eval but while at airport patient had worsening breast pain so came to ED. Pt labs show mild leukocytosis, ED US read as normal. Pt continue to have pain despite dilaudid, morphine and toradol. Transferred to CDU for; pain control, warm compresses, dose of abx given by ED for mastitis, and consult to plastics in AM. Admits to prior hx of sepsis from previous breast augmentation 2 years ago. Denies sob, fevers, chills, hx dvt/ pe, back pain, neck pain, numbness or tingling, weakness, discharge or any other complaints.

## 2018-11-05 NOTE — ED CDU PROVIDER DISPOSITION NOTE - CLINICAL COURSE
Familia SIMMS: Patient signed out to me. Right breast pain with worsening swelling and pain. Patient reassessed - worsening pain and swelling with swelling beyond area of demarcation. Morphine was given but patient still in pain on my reassessment. Dilaudid and toradol ordered as patient's pain uncontrolled. Discussed with patient's plastic surgeon who called her, Dr. Jayden Patino, [phone] who feels this is an expanding hematoma and plastics should be on board. Repeat US done as requested by Dr. Rosales, with no apparent hematoma but clinically, patien's swelling and pain is worse. Seen by Dr. Rosales of plastics and taken to the OR for evacuation of hematoma.

## 2018-11-05 NOTE — H&P ADULT - ASSESSMENT
ASSESSMENT:     28 F hx of breast augmentation bilaterally in [place], now with increased swelling, pressure and palpable collection to superior pole of right breast most likely hematoma;     PLAN:     - Admit to Dr. Rosales  - Repeat US   - Repeat CBC/Coags/BMP/Type and Screen   - Consented for OR - in chart   - NPO   - Prep for OR for evacuation of hematoma, possible implant removal

## 2018-11-06 VITALS
SYSTOLIC BLOOD PRESSURE: 110 MMHG | OXYGEN SATURATION: 97 % | TEMPERATURE: 98 F | HEART RATE: 98 BPM | DIASTOLIC BLOOD PRESSURE: 63 MMHG | RESPIRATION RATE: 18 BRPM

## 2018-11-06 LAB
BUN SERPL-MCNC: 11 MG/DL — SIGNIFICANT CHANGE UP (ref 7–23)
CALCIUM SERPL-MCNC: 8.5 MG/DL — SIGNIFICANT CHANGE UP (ref 8.4–10.5)
CHLORIDE SERPL-SCNC: 104 MMOL/L — SIGNIFICANT CHANGE UP (ref 98–107)
CO2 SERPL-SCNC: 23 MMOL/L — SIGNIFICANT CHANGE UP (ref 22–31)
CREAT SERPL-MCNC: 0.57 MG/DL — SIGNIFICANT CHANGE UP (ref 0.5–1.3)
GLUCOSE SERPL-MCNC: 133 MG/DL — HIGH (ref 70–99)
HCT VFR BLD CALC: 30.4 % — LOW (ref 34.5–45)
HGB BLD-MCNC: 10.4 G/DL — LOW (ref 11.5–15.5)
MAGNESIUM SERPL-MCNC: 1.9 MG/DL — SIGNIFICANT CHANGE UP (ref 1.6–2.6)
MCHC RBC-ENTMCNC: 32.7 PG — SIGNIFICANT CHANGE UP (ref 27–34)
MCHC RBC-ENTMCNC: 34.2 % — SIGNIFICANT CHANGE UP (ref 32–36)
MCV RBC AUTO: 95.6 FL — SIGNIFICANT CHANGE UP (ref 80–100)
NRBC # FLD: 0 — SIGNIFICANT CHANGE UP
PHOSPHATE SERPL-MCNC: 2.6 MG/DL — SIGNIFICANT CHANGE UP (ref 2.5–4.5)
PLATELET # BLD AUTO: 334 K/UL — SIGNIFICANT CHANGE UP (ref 150–400)
PMV BLD: 9 FL — SIGNIFICANT CHANGE UP (ref 7–13)
POTASSIUM SERPL-MCNC: 4.1 MMOL/L — SIGNIFICANT CHANGE UP (ref 3.5–5.3)
POTASSIUM SERPL-SCNC: 4.1 MMOL/L — SIGNIFICANT CHANGE UP (ref 3.5–5.3)
RBC # BLD: 3.18 M/UL — LOW (ref 3.8–5.2)
RBC # FLD: 11.7 % — SIGNIFICANT CHANGE UP (ref 10.3–14.5)
SODIUM SERPL-SCNC: 138 MMOL/L — SIGNIFICANT CHANGE UP (ref 135–145)
SPECIMEN SOURCE: SIGNIFICANT CHANGE UP
SPECIMEN SOURCE: SIGNIFICANT CHANGE UP
WBC # BLD: 15.22 K/UL — HIGH (ref 3.8–10.5)
WBC # FLD AUTO: 15.22 K/UL — HIGH (ref 3.8–10.5)

## 2018-11-06 RX ORDER — CEPHALEXIN 500 MG
1 CAPSULE ORAL
Qty: 14 | Refills: 0 | OUTPATIENT
Start: 2018-11-06 | End: 2018-11-12

## 2018-11-06 RX ADMIN — OXYCODONE HYDROCHLORIDE 5 MILLIGRAM(S): 5 TABLET ORAL at 02:59

## 2018-11-06 RX ADMIN — OXYCODONE HYDROCHLORIDE 5 MILLIGRAM(S): 5 TABLET ORAL at 02:22

## 2018-11-06 RX ADMIN — Medication 100 MILLIGRAM(S): at 05:38

## 2018-11-06 NOTE — PROGRESS NOTE ADULT - SUBJECTIVE AND OBJECTIVE BOX
ANESTHESIA POSTOP CHECK    28y Female POSTOP DAY 1 S/P     [ X] NO APPARENT ANESTHESIA COMPLICATIONS      Comments:

## 2018-11-06 NOTE — PROGRESS NOTE ADULT - SUBJECTIVE AND OBJECTIVE BOX
Resting in bed.  c/o mild pain  VSS - afebrile  JERRY - 50cc/ post-op    PE:  dressings c/d/i  right breast symmetrically firm as left  min tenderness  no visible or palpable hematoma or collections  no obvious sig difference in size or shape between L and R  obvious b/l shape distortion - symmetrical  JERRY with thin serosanguinous output    POD #1  H/H noted - no sign of active bleeding  leukocytosis noted - likely post-op reactive, no signs of cellulitis  doing well  ok for d/c home with JERRY and Abx  f/u in office tomorrow

## 2018-11-06 NOTE — PROGRESS NOTE ADULT - SUBJECTIVE AND OBJECTIVE BOX
CESAR BUNCH  [id number]    Subjective:    Patient seen and examined, reports feeling improved after OR yesterday  Requesting DC home today.        Objective:  T(C): 36.8 (11-06-18 @ 10:05), Max: 37.1 (11-06-18 @ 02:35)  HR: 98 (11-06-18 @ 10:05) (89 - 104)  BP: 110/63 (11-06-18 @ 10:05) (105/55 - 129/73)  RR: 18 (11-06-18 @ 10:05) (12 - 18)  SpO2: 97% (11-06-18 @ 10:05) (97% - 100%)  Wt(kg): --   11-06    138  |  104  |  11  ----------------------------<  133<H>  4.1   |  23  |  0.57    Ca    8.5      06 Nov 2018 06:33  Phos  2.6     11-06  Mg     1.9     11-06    TPro  7.3  /  Alb  4.0  /  TBili  0.5  /  DBili  x   /  AST  43<H>  /  ALT  23  /  AlkPhos  72  11-04                        10.4   15.22 )-----------( 334      ( 06 Nov 2018 06:33 )             30.4       11-05 @ 07:01  -  11-06 @ 07:00  --------------------------------------------------------  IN: 375 mL / OUT: 1800 mL / NET: -1425 mL    11-06 @ 07:01  -  11-06 @ 11:38  --------------------------------------------------------  IN: 0 mL / OUT: 10 mL / NET: -10 mL      PHYSICAL EXAM:    General: NAD   Breast: L breast soft, no palpable collections. JERRY SS, Incision CDI.             MEDICATIONS  (STANDING):  ceFAZolin   IVPB 1000 milliGRAM(s) IV Intermittent every 8 hours  enoxaparin Injectable 40 milliGRAM(s) SubCutaneous daily  lactated ringers. 1000 milliLiter(s) (75 mL/Hr) IV Continuous <Continuous>    MEDICATIONS  (PRN):  acetaminophen   Tablet .. 650 milliGRAM(s) Oral every 6 hours PRN Mild Pain (1 - 3)  HYDROmorphone  Injectable 0.5 milliGRAM(s) IV Push every 10 minutes PRN Severe Pain (7 - 10)  ondansetron    Tablet 4 milliGRAM(s) Oral every 6 hours PRN Nausea and/or Vomiting  oxyCODONE    IR 5 milliGRAM(s) Oral every 6 hours PRN Moderate Pain (4 - 6)      Assessment/Plan:  Patient is a 28y old F s/p L breast hematoma evacuation and replace of implant POD 1:     - Continue ace wrap to chest for compression garmet  - Continue Abx   - Reg diet   - DVT ppx  - Pain control   - DC home today

## 2018-11-07 LAB — GRAM STN WND: SIGNIFICANT CHANGE UP

## 2018-11-10 LAB — CULTURE - SURGICAL SITE: SIGNIFICANT CHANGE UP

## 2018-11-12 LAB — SURGICAL PATHOLOGY STUDY: SIGNIFICANT CHANGE UP

## 2018-12-05 LAB — FUNGUS SPEC QL CULT: SIGNIFICANT CHANGE UP

## 2022-06-13 ENCOUNTER — HOSPITAL ENCOUNTER (EMERGENCY)
Dept: HOSPITAL 74 - JERFT | Age: 32
Discharge: HOME | End: 2022-06-13
Payer: COMMERCIAL

## 2022-06-13 VITALS — TEMPERATURE: 98.7 F | SYSTOLIC BLOOD PRESSURE: 123 MMHG | DIASTOLIC BLOOD PRESSURE: 93 MMHG | HEART RATE: 76 BPM

## 2022-06-13 VITALS — BODY MASS INDEX: 23.6 KG/M2
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
## 2022-06-13 DIAGNOSIS — M62.830: Primary | ICD-10-CM

## 2023-07-21 NOTE — DISCHARGE NOTE ADULT - HOSPITAL COURSE
Dr. Aguilar 28F diagnosed with R breast swelling and tenderness underwent an evacuation of a R breast hematoma in the OR. The patient tolerated the procedure well. Postoperatively the patient was sent to the PACU. The patient was hemodynamically stable and was transferred to a surgical floor.The patient's pain was controlled by IV pain medications and then by PO pain medications. The patient was advanced to a regular diet and tolerated it well. The patient was placed on home medications.     At the time of discharge, the patient was hemodynamically stable, was tolerating PO diet, was voiding urine and passing stool, was ambulating, and was comfortable with adequate pain control. The patient was instructed to follow up with Dr. Rosales within 1 week after discharge from the hospital. The patient/family felt comfortable with discharge. The patient was discharged with a prescription for keflex and pain medication. The patient had no other issues.